# Patient Record
Sex: MALE | URBAN - METROPOLITAN AREA
[De-identification: names, ages, dates, MRNs, and addresses within clinical notes are randomized per-mention and may not be internally consistent; named-entity substitution may affect disease eponyms.]

---

## 2023-10-30 ENCOUNTER — CLAIMS CREATED FROM THE CLAIM WINDOW (OUTPATIENT)
Dept: URBAN - METROPOLITAN AREA MEDICAL CENTER 19 | Facility: MEDICAL CENTER | Age: 53
End: 2023-10-30
Payer: SELF-PAY

## 2023-10-30 DIAGNOSIS — D68.8 OTHER SPECIFIED COAGULATION DEFECTS: ICD-10-CM

## 2023-10-30 DIAGNOSIS — R74.01 ELEVATION OF LEVELS OF LIVER TRANSAMINASE LEVELS: ICD-10-CM

## 2023-10-30 DIAGNOSIS — K70.31 ALCOHOLIC CIRRHOSIS OF LIVER WITH ASCITES: ICD-10-CM

## 2023-10-30 DIAGNOSIS — D69.6 THROMBOCYTOPENIA: ICD-10-CM

## 2023-10-30 DIAGNOSIS — D64.89 ANEMIA DUE TO OTHER CAUSE, NOT CLASSIFIED: ICD-10-CM

## 2023-10-30 DIAGNOSIS — N17.8 OTHER ACUTE KIDNEY FAILURE: ICD-10-CM

## 2023-10-30 PROCEDURE — 99222 1ST HOSP IP/OBS MODERATE 55: CPT | Performed by: INTERNAL MEDICINE

## 2023-10-30 PROCEDURE — 99254 IP/OBS CNSLTJ NEW/EST MOD 60: CPT | Performed by: INTERNAL MEDICINE

## 2023-10-31 ENCOUNTER — CLAIMS CREATED FROM THE CLAIM WINDOW (OUTPATIENT)
Dept: URBAN - METROPOLITAN AREA MEDICAL CENTER 19 | Facility: MEDICAL CENTER | Age: 53
End: 2023-10-31
Payer: SELF-PAY

## 2023-10-31 DIAGNOSIS — K76.6 PORTAL HYPERTENSION: ICD-10-CM

## 2023-10-31 DIAGNOSIS — K31.89 OTHER DISEASES OF STOMACH AND DUODENUM: ICD-10-CM

## 2023-10-31 DIAGNOSIS — K74.60 CIRRHOSIS OF LIVER WITHOUT ASCITES, UNSPECIFIED HEPATIC CIRRHOSIS TYPE: ICD-10-CM

## 2023-10-31 DIAGNOSIS — I85.10 SECONDARY ESOPHAGEAL VARICES WITHOUT BLEEDING: ICD-10-CM

## 2023-10-31 PROCEDURE — 43235 EGD DIAGNOSTIC BRUSH WASH: CPT | Performed by: INTERNAL MEDICINE

## 2023-11-01 ENCOUNTER — TELEPHONE ENCOUNTER (OUTPATIENT)
Dept: URBAN - METROPOLITAN AREA CLINIC 113 | Facility: CLINIC | Age: 53
End: 2023-11-01

## 2023-11-01 ENCOUNTER — CLAIMS CREATED FROM THE CLAIM WINDOW (OUTPATIENT)
Dept: URBAN - METROPOLITAN AREA MEDICAL CENTER 19 | Facility: MEDICAL CENTER | Age: 53
End: 2023-11-01
Payer: SELF-PAY

## 2023-11-01 ENCOUNTER — LAB OUTSIDE AN ENCOUNTER (OUTPATIENT)
Dept: URBAN - METROPOLITAN AREA CLINIC 113 | Facility: CLINIC | Age: 53
End: 2023-11-01

## 2023-11-01 DIAGNOSIS — D64.89 ANEMIA DUE TO OTHER CAUSE: ICD-10-CM

## 2023-11-01 DIAGNOSIS — R79.89 ABNORMAL BILIRUBIN TEST: ICD-10-CM

## 2023-11-01 DIAGNOSIS — K70.31 ALCOHOLIC CIRRHOSIS OF LIVER WITH ASCITES: ICD-10-CM

## 2023-11-01 DIAGNOSIS — D69.59 OTHER SECONDARY THROMBOCYTOPENIA: ICD-10-CM

## 2023-11-01 DIAGNOSIS — I85.10 ESOPH VARICE OTHER DIS: ICD-10-CM

## 2023-11-01 PROCEDURE — 99232 SBSQ HOSP IP/OBS MODERATE 35: CPT | Performed by: INTERNAL MEDICINE

## 2023-12-13 ENCOUNTER — CLAIMS CREATED FROM THE CLAIM WINDOW (OUTPATIENT)
Dept: URBAN - METROPOLITAN AREA MEDICAL CENTER 19 | Facility: MEDICAL CENTER | Age: 53
End: 2023-12-13
Payer: SELF-PAY

## 2023-12-13 DIAGNOSIS — K76.7 HEPATORENAL SYNDROME: ICD-10-CM

## 2023-12-13 DIAGNOSIS — K70.31 ALCOHOLIC CIRRHOSIS OF LIVER WITH ASCITES: ICD-10-CM

## 2023-12-13 DIAGNOSIS — K76.82 HEPATIC ENCEPHALOPATHY: ICD-10-CM

## 2023-12-13 PROCEDURE — 99223 1ST HOSP IP/OBS HIGH 75: CPT | Performed by: STUDENT IN AN ORGANIZED HEALTH CARE EDUCATION/TRAINING PROGRAM

## 2023-12-13 PROCEDURE — 99233 SBSQ HOSP IP/OBS HIGH 50: CPT | Performed by: STUDENT IN AN ORGANIZED HEALTH CARE EDUCATION/TRAINING PROGRAM

## 2023-12-14 ENCOUNTER — CLAIMS CREATED FROM THE CLAIM WINDOW (OUTPATIENT)
Dept: URBAN - METROPOLITAN AREA MEDICAL CENTER 19 | Facility: MEDICAL CENTER | Age: 53
End: 2023-12-14
Payer: SELF-PAY

## 2023-12-14 DIAGNOSIS — K70.31 ALCOHOLIC CIRRHOSIS OF LIVER WITH ASCITES: ICD-10-CM

## 2023-12-14 DIAGNOSIS — K76.7 HEPATORENAL SYNDROME: ICD-10-CM

## 2023-12-14 DIAGNOSIS — K76.82 HEPATIC ENCEPHALOPATHY: ICD-10-CM

## 2023-12-14 PROCEDURE — 99233 SBSQ HOSP IP/OBS HIGH 50: CPT | Performed by: STUDENT IN AN ORGANIZED HEALTH CARE EDUCATION/TRAINING PROGRAM

## 2024-01-25 ENCOUNTER — LAB OUTSIDE AN ENCOUNTER (OUTPATIENT)
Dept: URBAN - METROPOLITAN AREA CLINIC 113 | Facility: CLINIC | Age: 54
End: 2024-01-25

## 2024-01-25 ENCOUNTER — OFFICE VISIT (OUTPATIENT)
Dept: URBAN - METROPOLITAN AREA CLINIC 113 | Facility: CLINIC | Age: 54
End: 2024-01-25
Payer: SELF-PAY

## 2024-01-25 VITALS
WEIGHT: 128 LBS | BODY MASS INDEX: 20.57 KG/M2 | TEMPERATURE: 97.3 F | HEIGHT: 66 IN | RESPIRATION RATE: 18 BRPM | SYSTOLIC BLOOD PRESSURE: 117 MMHG | DIASTOLIC BLOOD PRESSURE: 68 MMHG | HEART RATE: 71 BPM

## 2024-01-25 DIAGNOSIS — D50.0 ANEMIA: ICD-10-CM

## 2024-01-25 DIAGNOSIS — K70.31 ALCOHOLIC CIRRHOSIS OF LIVER WITH ASCITES: ICD-10-CM

## 2024-01-25 DIAGNOSIS — I85.00 ESOPHAGEAL VARICES: ICD-10-CM

## 2024-01-25 DIAGNOSIS — K76.82 HEPATIC ENCEPHALOPATHY: ICD-10-CM

## 2024-01-25 PROCEDURE — 99214 OFFICE O/P EST MOD 30 MIN: CPT | Performed by: INTERNAL MEDICINE

## 2024-01-25 RX ORDER — FUROSEMIDE 20 MG/1
1 TABLET TABLET ORAL ONCE A DAY
OUTPATIENT

## 2024-01-25 RX ORDER — SPIRONOLACTONE 50 MG/1
1 TABLET TABLET, FILM COATED ORAL ONCE A DAY
Status: ACTIVE | COMMUNITY

## 2024-01-25 RX ORDER — RIFAXIMIN 550 MG/1
1 TABLET TABLET ORAL TWICE A DAY
Status: ACTIVE | COMMUNITY

## 2024-01-25 RX ORDER — PROPRANOLOL HYDROCHLORIDE 10 MG/1
1 TABLET TABLET ORAL ONCE A DAY
OUTPATIENT

## 2024-01-25 RX ORDER — FUROSEMIDE 20 MG/1
1 TABLET TABLET ORAL ONCE A DAY
Status: ACTIVE | COMMUNITY

## 2024-01-25 RX ORDER — PROPRANOLOL HYDROCHLORIDE 10 MG/1
1 TABLET TABLET ORAL ONCE A DAY
Status: ACTIVE | COMMUNITY

## 2024-01-25 RX ORDER — PANTOPRAZOLE SODIUM 40 MG/1
1 TABLET TABLET, DELAYED RELEASE ORAL ONCE A DAY
Qty: 90 TABLET | Refills: 3 | OUTPATIENT
Start: 2024-01-25

## 2024-01-25 RX ORDER — RIFAXIMIN 550 MG/1
1 TABLET TABLET ORAL TWICE A DAY
OUTPATIENT

## 2024-01-25 RX ORDER — SPIRONOLACTONE 50 MG/1
1 TABLET TABLET, FILM COATED ORAL ONCE A DAY
OUTPATIENT

## 2024-01-25 RX ORDER — DICYCLOMINE HYDROCHLORIDE 20 MG/1
1 TABLET TABLET ORAL THREE TIMES A DAY
Status: ACTIVE | COMMUNITY

## 2024-01-25 NOTE — HPI-TODAY'S VISIT:
53-year-old male with a history of alcohol related cirrhosis presenting for follow-up after hospitalization. He was initially seen in hospital consultation on 10/30/2023 for evaluation of decompensated cirrhosis secondary to alcohol abuse with evidence for ascites.  His diuretics were held due to acute kidney injury.  Therapeutic paracentesis was planned.  He was encouraged continued alcohol avoidance.  Labs at that time demonstrated anemia with hemoglobin of 9.5.  He underwent an EGD on 10/30/2023 by Dr. Adams revealing grade 2 esophageal varices from 35 to 40 cm and mild portal hypertensive gastropathy, EGD otherwise normal to D3.  He was started on propranolol for variceal hemorrhage prophylaxis.  Outpatient follow-up for screening colonoscopy was recommended. He was readmitted in December secondary to altered mental status related to hepatic encephalopathy and abdominal pain after undergoing large-volume paracentesis outpatient, at which time he did not receive albumin.  Labs were consistent with acute kidney injury and concern for hepatorenal syndrome.  He was started on lactulose and rifaximin, as well as IV albumin.  He was ultimately discharged on low-dose Lasix 20 mg and spironolactone 50 mg once kidney function improved.  He was to maintain lactulose and rifaximin for management of hepatic encephalopathy. Fortunately, he has done well following hospital discharge.  His volume status is fairly stable on low-dose furosemide and spironolactone.  He has not experienced further episodes of confusion.  He is compliant with Xifaxan and is taking lactulose 3 times daily and having 2-3 nonbloody stools per day.  He has some abdominal swelling but has not required paracentesis since mid December.  He denies abdominal pain, nausea or vomiting.  His appetite is stable.  He has continued to avoid alcohol, with last drink in late September 2023. His only complaint is of intermittent reflux symptoms, which were well managed with pantoprazole while inpatient. Ultrasound-guided paracentesis 12/15/2023:7.8 L dark serosanguineous fluid removed. Labs 12/14/2023:H/H 7.1/20.6, MCV 99, , WBC 9.7.  AST 49, ALT 20, ALP 24.71, T. bili 8.4, albumin 3.8, total protein 6.8, CMP otherwise unremarkable aside from BUN/creat 52/2.57.  PT/INR 28.5/2.5. Abdominal x-ray 12/12/2023 showed no acute findings. Abdominal ultrasound 12/9/2023:Cirrhotic hepatic morphology, ascites. Ultrasound-guided paracentesis 12/9/2023:7.5 L removed, 12/5/2023: 9.6 L removed. Labs 10/30/2023:Negative hepatitis A antibody IgM, hepatitis B surface antigen, hepatitis B core antibody IgM, hepatitis C antibody.  Negative AMA, ASMA.  Elevated serum immunoglobulins with serum IgA 1224, IgG 2396, IgM 717.  Negative anti-LK M1.

## 2024-01-26 LAB
A/G RATIO: 0.6
ABSOLUTE BASOPHILS: 99
ABSOLUTE EOSINOPHILS: 348
ABSOLUTE LYMPHOCYTES: 1257
ABSOLUTE MONOCYTES: 973
ABSOLUTE NEUTROPHILS: 4423
AFP, SERUM, TUMOR MARKER: 4.3
ALBUMIN: 2.8
ALKALINE PHOSPHATASE: 64
ALT (SGPT): 25
AST (SGOT): 51
BASOPHILS: 1.4
BILIRUBIN, TOTAL: 6
BUN/CREATININE RATIO: 9
BUN: 16
CALCIUM: 8.4
CARBON DIOXIDE, TOTAL: 19
CHLORIDE: 101
CREATININE: 1.88
EGFR: 42
EOSINOPHILS: 4.9
FERRITIN, SERUM: 677
FOLATE (FOLIC ACID), SERUM: 5.6
GLOBULIN, TOTAL: 4.5
GLUCOSE: 158
HEMATOCRIT: 21
HEMOGLOBIN: 7.6
INR: 1.7
IRON BIND.CAP.(TIBC): 131
IRON SATURATION: 92
IRON: 121
LYMPHOCYTES: 17.7
MCH: 33
MCHC: 36.2
MCV: 91.3
MONOCYTES: 13.7
MPV: 11.7
NEUTROPHILS: 62.3
PLATELET COUNT: 162
POTASSIUM: 3.5
PROTEIN, TOTAL: 7.3
PT: 17.8
RDW: 14.4
RED BLOOD CELL COUNT: 2.3
SODIUM: 134
VITAMIN B12: 1692
WHITE BLOOD CELL COUNT: 7.1

## 2024-02-21 ENCOUNTER — COLON (OUTPATIENT)
Dept: URBAN - METROPOLITAN AREA MEDICAL CENTER 19 | Facility: MEDICAL CENTER | Age: 54
End: 2024-02-21

## 2024-02-21 RX ORDER — SPIRONOLACTONE 50 MG/1
1 TABLET TABLET, FILM COATED ORAL ONCE A DAY
Status: ACTIVE | COMMUNITY

## 2024-02-21 RX ORDER — FUROSEMIDE 20 MG/1
1 TABLET TABLET ORAL ONCE A DAY
Status: ACTIVE | COMMUNITY

## 2024-02-21 RX ORDER — PROPRANOLOL HYDROCHLORIDE 10 MG/1
1 TABLET TABLET ORAL ONCE A DAY
Status: ACTIVE | COMMUNITY

## 2024-02-21 RX ORDER — DICYCLOMINE HYDROCHLORIDE 20 MG/1
1 TABLET TABLET ORAL THREE TIMES A DAY
Status: ACTIVE | COMMUNITY

## 2024-02-21 RX ORDER — PANTOPRAZOLE SODIUM 40 MG/1
1 TABLET TABLET, DELAYED RELEASE ORAL ONCE A DAY
Qty: 90 TABLET | Refills: 3 | Status: ACTIVE | COMMUNITY
Start: 2024-01-25

## 2024-02-21 RX ORDER — RIFAXIMIN 550 MG/1
1 TABLET TABLET ORAL TWICE A DAY
Status: ACTIVE | COMMUNITY

## 2024-03-27 ENCOUNTER — COLON (OUTPATIENT)
Dept: URBAN - METROPOLITAN AREA MEDICAL CENTER 19 | Facility: MEDICAL CENTER | Age: 54
End: 2024-03-27

## 2024-03-27 RX ORDER — FUROSEMIDE 20 MG/1
1 TABLET TABLET ORAL ONCE A DAY
Status: ACTIVE | COMMUNITY

## 2024-03-27 RX ORDER — SPIRONOLACTONE 50 MG/1
1 TABLET TABLET, FILM COATED ORAL ONCE A DAY
Status: ACTIVE | COMMUNITY

## 2024-03-27 RX ORDER — PANTOPRAZOLE SODIUM 40 MG/1
1 TABLET TABLET, DELAYED RELEASE ORAL ONCE A DAY
Qty: 90 TABLET | Refills: 3 | Status: ACTIVE | COMMUNITY
Start: 2024-01-25

## 2024-03-27 RX ORDER — PROPRANOLOL HYDROCHLORIDE 10 MG/1
1 TABLET TABLET ORAL ONCE A DAY
Status: ACTIVE | COMMUNITY

## 2024-03-27 RX ORDER — DICYCLOMINE HYDROCHLORIDE 20 MG/1
1 TABLET TABLET ORAL THREE TIMES A DAY
Status: ACTIVE | COMMUNITY

## 2024-03-27 RX ORDER — RIFAXIMIN 550 MG/1
1 TABLET TABLET ORAL TWICE A DAY
Status: ACTIVE | COMMUNITY

## 2024-04-11 ENCOUNTER — OV EP (OUTPATIENT)
Dept: URBAN - METROPOLITAN AREA CLINIC 113 | Facility: CLINIC | Age: 54
End: 2024-04-11

## 2024-04-11 RX ORDER — DICYCLOMINE HYDROCHLORIDE 20 MG/1
1 TABLET TABLET ORAL THREE TIMES A DAY
Status: ACTIVE | COMMUNITY

## 2024-04-11 RX ORDER — FUROSEMIDE 20 MG/1
1 TABLET TABLET ORAL ONCE A DAY
Status: ACTIVE | COMMUNITY

## 2024-04-11 RX ORDER — RIFAXIMIN 550 MG/1
1 TABLET TABLET ORAL TWICE A DAY
Status: ACTIVE | COMMUNITY

## 2024-04-11 RX ORDER — PANTOPRAZOLE SODIUM 40 MG/1
1 TABLET TABLET, DELAYED RELEASE ORAL ONCE A DAY
Qty: 90 TABLET | Refills: 3 | Status: ACTIVE | COMMUNITY
Start: 2024-01-25

## 2024-04-11 RX ORDER — SPIRONOLACTONE 50 MG/1
1 TABLET TABLET, FILM COATED ORAL ONCE A DAY
Status: ACTIVE | COMMUNITY

## 2024-04-11 RX ORDER — PROPRANOLOL HYDROCHLORIDE 10 MG/1
1 TABLET TABLET ORAL ONCE A DAY
Status: ACTIVE | COMMUNITY

## 2024-05-03 ENCOUNTER — LAB OUTSIDE AN ENCOUNTER (OUTPATIENT)
Dept: URBAN - METROPOLITAN AREA CLINIC 113 | Facility: CLINIC | Age: 54
End: 2024-05-03

## 2024-05-03 ENCOUNTER — DASHBOARD ENCOUNTERS (OUTPATIENT)
Age: 54
End: 2024-05-03

## 2024-05-03 ENCOUNTER — OFFICE VISIT (OUTPATIENT)
Dept: URBAN - METROPOLITAN AREA CLINIC 113 | Facility: CLINIC | Age: 54
End: 2024-05-03
Payer: SELF-PAY

## 2024-05-03 VITALS
DIASTOLIC BLOOD PRESSURE: 60 MMHG | BODY MASS INDEX: 20.34 KG/M2 | RESPIRATION RATE: 18 BRPM | WEIGHT: 126.6 LBS | TEMPERATURE: 97.7 F | HEART RATE: 73 BPM | SYSTOLIC BLOOD PRESSURE: 103 MMHG | HEIGHT: 66 IN

## 2024-05-03 DIAGNOSIS — I85.00 ESOPHAGEAL VARICES: ICD-10-CM

## 2024-05-03 DIAGNOSIS — M79.604 RIGHT LEG PAIN: ICD-10-CM

## 2024-05-03 DIAGNOSIS — K70.31 ALCOHOLIC CIRRHOSIS OF LIVER WITH ASCITES: ICD-10-CM

## 2024-05-03 DIAGNOSIS — K76.82 HEPATIC ENCEPHALOPATHY: ICD-10-CM

## 2024-05-03 PROCEDURE — 99214 OFFICE O/P EST MOD 30 MIN: CPT | Performed by: NURSE PRACTITIONER

## 2024-05-03 RX ORDER — TIZANIDINE 2 MG/1
1 TABLET AT BEDTIME AS NEEDED TABLET ORAL ONCE A DAY
Status: ACTIVE | COMMUNITY

## 2024-05-03 RX ORDER — PROPRANOLOL HYDROCHLORIDE 10 MG/1
1 TABLET TABLET ORAL ONCE A DAY
OUTPATIENT

## 2024-05-03 RX ORDER — SPIRONOLACTONE 50 MG/1
1 TABLET TABLET, FILM COATED ORAL ONCE A DAY
Status: ACTIVE | COMMUNITY

## 2024-05-03 RX ORDER — FUROSEMIDE 20 MG/1
1 TABLET TABLET ORAL ONCE A DAY
OUTPATIENT

## 2024-05-03 RX ORDER — PANTOPRAZOLE SODIUM 40 MG/1
1 TABLET TABLET, DELAYED RELEASE ORAL ONCE A DAY
OUTPATIENT
Start: 2024-01-25

## 2024-05-03 RX ORDER — FUROSEMIDE 20 MG/1
1 TABLET TABLET ORAL ONCE A DAY
Status: ACTIVE | COMMUNITY

## 2024-05-03 RX ORDER — SPIRONOLACTONE 50 MG/1
1 TABLET TABLET, FILM COATED ORAL ONCE A DAY
OUTPATIENT

## 2024-05-03 RX ORDER — PROPRANOLOL HYDROCHLORIDE 10 MG/1
1 TABLET TABLET ORAL ONCE A DAY
Status: ACTIVE | COMMUNITY

## 2024-05-03 RX ORDER — PANTOPRAZOLE SODIUM 40 MG/1
1 TABLET TABLET, DELAYED RELEASE ORAL ONCE A DAY
Qty: 90 TABLET | Refills: 3 | Status: ACTIVE | COMMUNITY
Start: 2024-01-25

## 2024-05-03 RX ORDER — RIFAXIMIN 550 MG/1
1 TABLET TABLET ORAL TWICE A DAY
OUTPATIENT

## 2024-05-03 RX ORDER — DICYCLOMINE HYDROCHLORIDE 20 MG/1
1 TABLET TABLET ORAL THREE TIMES A DAY
Status: ACTIVE | COMMUNITY

## 2024-05-03 RX ORDER — RIFAXIMIN 550 MG/1
1 TABLET TABLET ORAL TWICE A DAY
Status: ACTIVE | COMMUNITY

## 2024-05-08 ENCOUNTER — TELEPHONE ENCOUNTER (OUTPATIENT)
Dept: URBAN - METROPOLITAN AREA CLINIC 113 | Facility: CLINIC | Age: 54
End: 2024-05-08

## 2024-06-14 ENCOUNTER — OFFICE VISIT (OUTPATIENT)
Dept: URBAN - METROPOLITAN AREA CLINIC 113 | Facility: CLINIC | Age: 54
End: 2024-06-14

## 2024-06-14 RX ORDER — DICYCLOMINE HYDROCHLORIDE 20 MG/1
1 TABLET TABLET ORAL THREE TIMES A DAY
Status: ACTIVE | COMMUNITY

## 2024-06-14 RX ORDER — TIZANIDINE 2 MG/1
1 TABLET AT BEDTIME AS NEEDED TABLET ORAL ONCE A DAY
Status: ACTIVE | COMMUNITY

## 2024-06-14 RX ORDER — PANTOPRAZOLE SODIUM 40 MG/1
1 TABLET TABLET, DELAYED RELEASE ORAL ONCE A DAY
Status: ACTIVE | COMMUNITY
Start: 2024-01-25

## 2024-06-14 RX ORDER — SPIRONOLACTONE 50 MG/1
1 TABLET TABLET, FILM COATED ORAL ONCE A DAY
Status: ACTIVE | COMMUNITY

## 2024-06-14 RX ORDER — FUROSEMIDE 20 MG/1
1 TABLET TABLET ORAL ONCE A DAY
Status: ACTIVE | COMMUNITY

## 2024-06-14 RX ORDER — RIFAXIMIN 550 MG/1
1 TABLET TABLET ORAL TWICE A DAY
Status: ACTIVE | COMMUNITY

## 2024-06-14 RX ORDER — PROPRANOLOL HYDROCHLORIDE 10 MG/1
1 TABLET TABLET ORAL ONCE A DAY
Status: ACTIVE | COMMUNITY

## 2024-06-14 NOTE — HPI-TODAY'S VISIT:
53-year-old male with a history of alcohol related cirrhosis presenting for follow-up. He has decompensated alcohol-related cirrhosis complicated by portal hypertension with ascites and esophageal varices, thrombocytopenia, and hepatic encephalopathy.   He has had multiple abdominal paracenteses ordered by his PCP.  He states he has received IV albumin with the last 2 draws.  US guided paracentesis- 4/10/2024:10.2 L of fluid removed. 3/18/2024:10.1 L of fluid removed. 2/5/2024:8.55 L of straw-colored fluid removed.  Colonoscopy 3/27/2024:Moderate ascending colon edema due to portal hypertensive colopathy, moderate external and internal hemorrhoids, normal TI.  Labs 1/25/2024:H/H10.6/21, MCV 91.3, , WBC 7.1.  AST 51, ALT 25, ALP 64, T. bili 6.0, albumin 2.8, total protein 7.3, CMP otherwise unremarkable aside from glucose 158, BUN/creat 16/1.88.  PT/INR 17.8/1.7.  Ferritin 677.  aFP 4.3.  Folate 5.6.  Vitamin B12 1692.  Iron 121, TIBC 131, iron sat 92.  December 2023: admitted secondary to altered mental status related to hepatic encephalopathy and abdominal pain after undergoing large-volume paracentesis outpatient, at which time he did not receive albumin. Labs were consistent with acute kidney injury and concern for hepatorenal syndrome. He was started on lactulose and rifaximin, as well as IV albumin. He was ultimately discharged on low-dose Lasix 20 mg and spironolactone 50 mg once kidney function improved. He was to maintain lactulose and rifaximin for management of hepatic encephalopathy.  Ultrasound-guided paracentesis 12/15/2023:7.8 L dark serosanguineous fluid removed.  Labs 12/14/2023:H/H 7.1/20.6, MCV 99, , WBC 9.7. AST 49, ALT 20, ALP 24.71, T. bili 8.4, albumin 3.8, total protein 6.8, CMP otherwise unremarkable aside from BUN/creat 52/2.57. PT/INR 28.5/2.5.  Abdominal x-ray 12/12/2023 showed no acute findings.  Abdominal ultrasound 12/9/2023:Cirrhotic hepatic morphology, ascites.  Ultrasound-guided paracentesis 12/9/2023:7.5 L removed, 12/5/2023: 9.6 L removed.  Labs 10/30/2023:Negative hepatitis A antibody IgM, hepatitis B surface antigen, hepatitis B core antibody IgM, hepatitis C antibody. Negative AMA, ASMA. Elevated serum immunoglobulins with serum IgA 1224, IgG 2396, IgM 717. Negative anti-LK M1.  EGD 10/30/2023 by Dr. Adams revealing grade 2 esophageal varices from 35 to 40 cm and mild portal hypertensive gastropathy, EGD otherwise normal to D3. He was started on propranolol for variceal hemorrhage prophylaxis.   His last alcohol was in September, 2023.  He requests referral for liver transplant, but unfortunately still does not have medical coverage. Labs.  June 11, 2024.  Hemoglobin 9.6.  MCV 96.  Platelet count 128,000.  Albumin 3.3.  Total bilirubin 6.7.  ALT 19.  INR 2.4.  Ammonia 62.  Creatinine 1.67. Paracentesis, June 8, 2024.  Ascitic fluid albumin less than 1.0.  Total protein and ascitic fluid less than 2.0. Admitted June 2024.  Diagnosed with spontaneous bacterial peritonitis. CT CAT scan.  Abdomen pelvis without contrast.  June 2024.  Cirrhotic liver.  Ascites noted.  Fluid-filled stomach. Labs.  May 2024.  Creatinine 1.9.  AST 43, ALT 36.  Total bilirubin 4.8.  Alpha-fetoprotein 3.4.  Hemoglobin 7.8.  Platelet count 190,000.  INR 1.7. Lower extremity ultrasound.  No DVT detected.

## 2024-07-11 ENCOUNTER — TELEPHONE ENCOUNTER (OUTPATIENT)
Dept: URBAN - METROPOLITAN AREA CLINIC 113 | Facility: CLINIC | Age: 54
End: 2024-07-11

## 2024-07-11 ENCOUNTER — CLAIMS CREATED FROM THE CLAIM WINDOW (OUTPATIENT)
Dept: URBAN - METROPOLITAN AREA MEDICAL CENTER 19 | Facility: MEDICAL CENTER | Age: 54
End: 2024-07-11
Payer: SELF-PAY

## 2024-07-11 DIAGNOSIS — K76.82 ACUTE HEPATIC ENCEPHALOPATHY: ICD-10-CM

## 2024-07-11 DIAGNOSIS — K70.31 ALCOHOLIC CIRRHOSIS OF LIVER WITH ASCITES: ICD-10-CM

## 2024-07-11 DIAGNOSIS — Z87.19 PERSONAL HISTORY OF OTHER DISEASES OF THE DIGESTIVE SYSTEM: ICD-10-CM

## 2024-07-11 DIAGNOSIS — K56.7 ILEUS: ICD-10-CM

## 2024-07-11 PROCEDURE — 99254 IP/OBS CNSLTJ NEW/EST MOD 60: CPT | Performed by: INTERNAL MEDICINE

## 2024-07-12 ENCOUNTER — CLAIMS CREATED FROM THE CLAIM WINDOW (OUTPATIENT)
Dept: URBAN - METROPOLITAN AREA MEDICAL CENTER 19 | Facility: MEDICAL CENTER | Age: 54
End: 2024-07-12
Payer: SELF-PAY

## 2024-07-12 DIAGNOSIS — D64.89 ANEMIA DUE TO OTHER CAUSE, NOT CLASSIFIED: ICD-10-CM

## 2024-07-12 DIAGNOSIS — K76.82 HEPATIC ENCEPHALOPATHY: ICD-10-CM

## 2024-07-12 DIAGNOSIS — K70.30 ALC (ALCOHOLIC LIVER CIRRHOSIS): ICD-10-CM

## 2024-07-12 PROCEDURE — 99232 SBSQ HOSP IP/OBS MODERATE 35: CPT | Performed by: INTERNAL MEDICINE

## 2024-07-14 ENCOUNTER — CLAIMS CREATED FROM THE CLAIM WINDOW (OUTPATIENT)
Dept: URBAN - METROPOLITAN AREA MEDICAL CENTER 19 | Facility: MEDICAL CENTER | Age: 54
End: 2024-07-14
Payer: SELF-PAY

## 2024-07-14 DIAGNOSIS — K76.82 HEPATIC ENCEPHALOPATHY: ICD-10-CM

## 2024-07-14 DIAGNOSIS — Z87.19 PERSONAL HISTORY OF OTHER DISEASES OF THE DIGESTIVE SYSTEM: ICD-10-CM

## 2024-07-14 DIAGNOSIS — K70.30 ALC (ALCOHOLIC LIVER CIRRHOSIS): ICD-10-CM

## 2024-07-14 PROCEDURE — 99233 SBSQ HOSP IP/OBS HIGH 50: CPT | Performed by: INTERNAL MEDICINE

## 2024-07-18 ENCOUNTER — OFFICE VISIT (OUTPATIENT)
Dept: URBAN - METROPOLITAN AREA CLINIC 113 | Facility: CLINIC | Age: 54
End: 2024-07-18

## 2024-08-26 ENCOUNTER — LAB OUTSIDE AN ENCOUNTER (OUTPATIENT)
Dept: URBAN - METROPOLITAN AREA CLINIC 113 | Facility: CLINIC | Age: 54
End: 2024-08-26

## 2024-08-26 ENCOUNTER — OFFICE VISIT (OUTPATIENT)
Dept: URBAN - METROPOLITAN AREA CLINIC 113 | Facility: CLINIC | Age: 54
End: 2024-08-26
Payer: SELF-PAY

## 2024-08-26 VITALS
DIASTOLIC BLOOD PRESSURE: 80 MMHG | BODY MASS INDEX: 19.77 KG/M2 | WEIGHT: 123 LBS | HEIGHT: 66 IN | TEMPERATURE: 97.9 F | RESPIRATION RATE: 18 BRPM | HEART RATE: 81 BPM | SYSTOLIC BLOOD PRESSURE: 123 MMHG

## 2024-08-26 DIAGNOSIS — K70.31 ALCOHOLIC CIRRHOSIS OF LIVER WITH ASCITES: ICD-10-CM

## 2024-08-26 DIAGNOSIS — K74.69 CIRRHOSIS, CRYPTOGENIC: ICD-10-CM

## 2024-08-26 DIAGNOSIS — I85.00 ESOPHAGEAL VARICES: ICD-10-CM

## 2024-08-26 PROCEDURE — 99214 OFFICE O/P EST MOD 30 MIN: CPT | Performed by: INTERNAL MEDICINE

## 2024-08-26 RX ORDER — PANTOPRAZOLE SODIUM 40 MG/1
1 TABLET TABLET, DELAYED RELEASE ORAL ONCE A DAY
Status: ACTIVE | COMMUNITY
Start: 2024-01-25

## 2024-08-26 RX ORDER — PROPRANOLOL HYDROCHLORIDE 10 MG/1
1 TABLET TABLET ORAL ONCE A DAY
Status: ACTIVE | COMMUNITY

## 2024-08-26 RX ORDER — FUROSEMIDE 40 MG/1
1 TABLET TABLET ORAL ONCE A DAY
Status: ACTIVE | COMMUNITY

## 2024-08-26 RX ORDER — SPIRONOLACTONE 50 MG/1
1 TABLET TABLET, FILM COATED ORAL ONCE A DAY
Qty: 30 | Refills: 11 | OUTPATIENT
Start: 2024-08-26 | End: 2025-08-21

## 2024-08-26 NOTE — PHYSICAL EXAM GASTROINTESTINAL
Abdomen , soft, nontender, distended, no guarding or rigidity, no masses palpable, normal bowel sounds. Liver and Spleen, no hepatomegaly present, no hepatosplenomegaly, liver nontender, spleen not palpable (moderate ascites, no edema)

## 2024-08-26 NOTE — HPI-TODAY'S VISIT:
54-year-old male with a history of alcohol related cirrhosis presenting for follow-up. He has decompensated alcohol-related cirrhosis complicated by portal hypertension with ascites and esophageal varices, thrombocytopenia, and hepatic encephalopathy.  In July 2024, he was diagnosed with possible portal vein thrombosis. He was started on Xarelto. He had weakness. He is admitted to the hospital in August. He is found to have a hemoglobin in the 5.3 range. His Xarelto was stopped. At some point his spironolactone was discontinued as well. He has for history of ascites. He tries to follow a low-salt diet. I am concerned he might not be compliant with this aspect of care. He does take furosemide. He brings his medications in today. He also is on rifaximin which he obtains from [facility]. He does not have dysphagia heartburn or regurgitation at this time. He still wants to see the [place] liver transplant team. He does not have insurance. I am concerned this will be a major barrier for transplant  Abdominal ultrasound. Paracentesis. 5.5 L removed August 13, 2024.  Labs. August 2, 2024. Hemoglobin 5.4. Platelet count 150,000. He was transfused with 2 units of blood. Total bilirubin 5.0. AST 66, alkaline phosphatase 133. Creatinine 1.7. INR 2.3. HIV negative.  Paracentesis August 2024. Ascitic fluid white blood cell count 93. Culture was negative. Ascitic fluid albumin less than 1  Ultrasound-guided thoracentesis. For right pleural effusion. August 2024. 1900 cc removed.  Abdominal ultrasound with Doppler July 2024. Portal vein thrombosis could not be excluded. Cirrhotic liver morphology noted  CT scan abdomen pelvis without contrast. June 2024. Cirrhotic liver. Ascites noted. Fluid-filled stomach  Labs. June 11, 2024. Hemoglobin 9.6. MCV 96. Platelet count 128,000. Albumin 3.3. Total bilirubin 6.7. ALT 19. INR 2.4. Ammonia 62. Creatinine 1.67.  Paracentesis, June 8, 2024. Ascitic fluid albumin less than 1.0. Total protein and ascitic fluid less than 2.0.  Admitted June 2024. Diagnosed with spontaneous bacterial peritonitis.  CT CAT scan. Abdomen pelvis without contrast. June 2024. Cirrhotic liver. Ascites noted. Fluid-filled stomach.  Labs. May 2024. Creatinine 1.9. AST 43, ALT 36. Total bilirubin 4.8. Alpha-fetoprotein 3.4. Hemoglobin 7.8. Platelet count 190,000. INR 1.7.  Lower extremity ultrasound. No DVT detected.  US guided paracentesis- 4/10/2024:10.2 L of fluid removed. 3/18/2024:10.1 L of fluid removed. 2/5/2024:8.55 L of straw-colored fluid removed.  Colonoscopy 3/27/2024:Moderate ascending colon edema due to portal hypertensive colopathy, moderate external and internal hemorrhoids, normal TI.  Labs 1/25/2024:H/H10.6/21, MCV 91.3, , WBC 7.1.  AST 51, ALT 25, ALP 64, T. bili 6.0, albumin 2.8, total protein 7.3, CMP otherwise unremarkable aside from glucose 158, BUN/creat 16/1.88.  PT/INR 17.8/1.7.  Ferritin 677.  aFP 4.3.  Folate 5.6.  Vitamin B12 1692.  Iron 121, TIBC 131, iron sat 92.  December 2023: admitted secondary to altered mental status related to hepatic encephalopathy and abdominal pain after undergoing large-volume paracentesis outpatient, at which time he did not receive albumin. Labs were consistent with acute kidney injury and concern for hepatorenal syndrome. He was started on lactulose and rifaximin, as well as IV albumin. He was ultimately discharged on low-dose Lasix 20 mg and spironolactone 50 mg once kidney function improved. He was to maintain lactulose and rifaximin for management of hepatic encephalopathy.  Ultrasound-guided paracentesis 12/15/2023:7.8 L dark serosanguineous fluid removed.  Labs 12/14/2023:H/H 7.1/20.6, MCV 99, , WBC 9.7. AST 49, ALT 20, ALP 24.71, T. bili 8.4, albumin 3.8, total protein 6.8, CMP otherwise unremarkable aside from BUN/creat 52/2.57. PT/INR 28.5/2.5.  Abdominal x-ray 12/12/2023 showed no acute findings.  Abdominal ultrasound 12/9/2023:Cirrhotic hepatic morphology, ascites.  Ultrasound-guided paracentesis 12/9/2023:7.5 L removed, 12/5/2023: 9.6 L removed.  Labs 10/30/2023:Negative hepatitis A antibody IgM, hepatitis B surface antigen, hepatitis B core antibody IgM, hepatitis C antibody. Negative AMA, ASMA. Elevated serum immunoglobulins with serum IgA 1224, IgG 2396, IgM 717. Negative anti-LK M1.  EGD 10/30/2023 by Dr. Adams revealing grade 2 esophageal varices from 35 to 40 cm and mild portal hypertensive gastropathy, EGD otherwise normal to D3. He was started on propranolol for variceal hemorrhage prophylaxis.   His last alcohol was in September, 2023.  He requests referral for liver transplant, but unfortunately still does not have medical coverage.

## 2024-08-26 NOTE — PHYSICAL EXAM CHEST:
What Type Of Note Output Would You Prefer (Optional)?: Bullet Format
no lesions,  no deformities,  no traumatic injuries,  no significant scars are present,  chest wall non-tender,  no masses present, breathing is unlabored without accessory muscle use,normal breath sounds
Is The Patient Presenting As Previously Scheduled?: Yes
How Severe Is Your Rash?: moderate
Is This A New Presentation, Or A Follow-Up?: Rash

## 2024-08-27 LAB
A/G RATIO: 0.7
ABSOLUTE BASOPHILS: 42
ABSOLUTE EOSINOPHILS: 218
ABSOLUTE LYMPHOCYTES: 952
ABSOLUTE MONOCYTES: 905
ABSOLUTE NEUTROPHILS: 3084
AFP, SERUM, TUMOR MARKER: 2.4
ALBUMIN: 2.9
ALKALINE PHOSPHATASE: 79
ALT (SGPT): 13
AST (SGOT): 28
BASOPHILS: 0.8
BILIRUBIN, TOTAL: 6.4
BUN/CREATININE RATIO: 9
BUN: 16
CALCIUM: 8.4
CARBON DIOXIDE, TOTAL: 19
CHLORIDE: 107
CREATININE: 1.84
EGFR: 43
EOSINOPHILS: 4.2
FERRITIN, SERUM: 1230
FOLATE (FOLIC ACID), SERUM: 12.7
GLOBULIN, TOTAL: 4.1
GLUCOSE: 183
HEMATOCRIT: 18.2
HEMOGLOBIN: 6.5
INR: 1.6
IRON BIND.CAP.(TIBC): 120
IRON SATURATION: 92
IRON: 110
LYMPHOCYTES: 18.3
MCH: 30.5
MCHC: 35.7
MCV: 85.4
MONOCYTES: 17.4
MPV: 12.4
NEUTROPHILS: 59.3
PLATELET COUNT: 170
POTASSIUM: 3.3
PROTEIN, TOTAL: 7
PT: 16.6
RDW: 21
RED BLOOD CELL COUNT: 2.13
SODIUM: 136
VITAMIN B12: 1434
WHITE BLOOD CELL COUNT: 5.2

## 2024-09-04 ENCOUNTER — TELEPHONE ENCOUNTER (OUTPATIENT)
Dept: URBAN - METROPOLITAN AREA CLINIC 113 | Facility: CLINIC | Age: 54
End: 2024-09-04

## 2024-09-18 ENCOUNTER — TELEPHONE ENCOUNTER (OUTPATIENT)
Dept: URBAN - METROPOLITAN AREA SURGERY CENTER 25 | Facility: SURGERY CENTER | Age: 54
End: 2024-09-18

## 2024-10-01 ENCOUNTER — CLAIMS CREATED FROM THE CLAIM WINDOW (OUTPATIENT)
Dept: URBAN - METROPOLITAN AREA MEDICAL CENTER 19 | Facility: MEDICAL CENTER | Age: 54
End: 2024-10-01
Payer: SELF-PAY

## 2024-10-01 ENCOUNTER — OFFICE VISIT (OUTPATIENT)
Dept: URBAN - METROPOLITAN AREA MEDICAL CENTER 19 | Facility: MEDICAL CENTER | Age: 54
End: 2024-10-01

## 2024-10-01 DIAGNOSIS — R18.8 ABDOMINAL ASCITES: ICD-10-CM

## 2024-10-01 DIAGNOSIS — K74.69 CIRRHOSIS, CRYPTOGENIC: ICD-10-CM

## 2024-10-01 DIAGNOSIS — I85.10 ESOPH VARICE OTHER DIS: ICD-10-CM

## 2024-10-01 DIAGNOSIS — R10.84 ABDOMINAL CRAMPING, GENERALIZED: ICD-10-CM

## 2024-10-01 PROCEDURE — 99233 SBSQ HOSP IP/OBS HIGH 50: CPT | Performed by: INTERNAL MEDICINE

## 2024-10-01 RX ORDER — FUROSEMIDE 40 MG/1
1 TABLET TABLET ORAL ONCE A DAY
Status: ACTIVE | COMMUNITY

## 2024-10-01 RX ORDER — SPIRONOLACTONE 50 MG/1
1 TABLET TABLET, FILM COATED ORAL ONCE A DAY
Qty: 30 | Refills: 11 | Status: ACTIVE | COMMUNITY
Start: 2024-08-26 | End: 2025-08-21

## 2024-10-01 RX ORDER — PROPRANOLOL HYDROCHLORIDE 10 MG/1
1 TABLET TABLET ORAL ONCE A DAY
Status: ACTIVE | COMMUNITY

## 2024-10-01 RX ORDER — PANTOPRAZOLE SODIUM 40 MG/1
1 TABLET TABLET, DELAYED RELEASE ORAL ONCE A DAY
Status: ACTIVE | COMMUNITY
Start: 2024-01-25

## 2024-10-02 ENCOUNTER — CLAIMS CREATED FROM THE CLAIM WINDOW (OUTPATIENT)
Dept: URBAN - METROPOLITAN AREA MEDICAL CENTER 19 | Facility: MEDICAL CENTER | Age: 54
End: 2024-10-02
Payer: SELF-PAY

## 2024-10-02 DIAGNOSIS — I85.10 SECONDARY ESOPHAGEAL VARICES WITHOUT BLEEDING: ICD-10-CM

## 2024-10-02 DIAGNOSIS — K76.6 PORTAL HYPERTENSION: ICD-10-CM

## 2024-10-02 DIAGNOSIS — K31.89 OTHER DISEASES OF STOMACH AND DUODENUM: ICD-10-CM

## 2024-10-02 PROCEDURE — 43235 EGD DIAGNOSTIC BRUSH WASH: CPT | Performed by: INTERNAL MEDICINE

## 2024-10-03 ENCOUNTER — CLAIMS CREATED FROM THE CLAIM WINDOW (OUTPATIENT)
Dept: URBAN - METROPOLITAN AREA MEDICAL CENTER 19 | Facility: MEDICAL CENTER | Age: 54
End: 2024-10-03
Payer: SELF-PAY

## 2024-10-03 DIAGNOSIS — K74.69 CIRRHOSIS, CRYPTOGENIC: ICD-10-CM

## 2024-10-03 DIAGNOSIS — I85.10 ESOPH VARICE OTHER DIS: ICD-10-CM

## 2024-10-03 DIAGNOSIS — D64.89 ANEMIA DUE TO OTHER CAUSE: ICD-10-CM

## 2024-10-03 DIAGNOSIS — R18.8 ABDOMINAL ASCITES: ICD-10-CM

## 2024-10-03 PROCEDURE — 99232 SBSQ HOSP IP/OBS MODERATE 35: CPT | Performed by: INTERNAL MEDICINE

## 2024-11-26 ENCOUNTER — OFFICE VISIT (OUTPATIENT)
Dept: URBAN - METROPOLITAN AREA CLINIC 113 | Facility: CLINIC | Age: 54
End: 2024-11-26

## 2024-11-26 RX ORDER — PROPRANOLOL HYDROCHLORIDE 10 MG/1
1 TABLET TABLET ORAL ONCE A DAY
Status: ACTIVE | COMMUNITY

## 2024-11-26 RX ORDER — SPIRONOLACTONE 50 MG/1
1 TABLET TABLET, FILM COATED ORAL ONCE A DAY
Qty: 30 | Refills: 11 | Status: ACTIVE | COMMUNITY
Start: 2024-08-26 | End: 2025-08-21

## 2024-11-26 RX ORDER — FUROSEMIDE 40 MG/1
1 TABLET TABLET ORAL ONCE A DAY
Status: ACTIVE | COMMUNITY

## 2024-11-26 RX ORDER — PANTOPRAZOLE SODIUM 40 MG/1
1 TABLET TABLET, DELAYED RELEASE ORAL ONCE A DAY
Status: ACTIVE | COMMUNITY
Start: 2024-01-25

## 2024-12-10 ENCOUNTER — LAB OUTSIDE AN ENCOUNTER (OUTPATIENT)
Dept: URBAN - METROPOLITAN AREA CLINIC 113 | Facility: CLINIC | Age: 54
End: 2024-12-10

## 2024-12-10 ENCOUNTER — TELEPHONE ENCOUNTER (OUTPATIENT)
Dept: URBAN - METROPOLITAN AREA CLINIC 113 | Facility: CLINIC | Age: 54
End: 2024-12-10

## 2024-12-12 ENCOUNTER — OFFICE VISIT (OUTPATIENT)
Dept: URBAN - METROPOLITAN AREA CLINIC 113 | Facility: CLINIC | Age: 54
End: 2024-12-12
Payer: COMMERCIAL

## 2024-12-12 ENCOUNTER — TELEPHONE ENCOUNTER (OUTPATIENT)
Dept: URBAN - METROPOLITAN AREA CLINIC 113 | Facility: CLINIC | Age: 54
End: 2024-12-12

## 2024-12-12 ENCOUNTER — LAB OUTSIDE AN ENCOUNTER (OUTPATIENT)
Dept: URBAN - METROPOLITAN AREA CLINIC 113 | Facility: CLINIC | Age: 54
End: 2024-12-12

## 2024-12-12 VITALS
HEART RATE: 80 BPM | DIASTOLIC BLOOD PRESSURE: 75 MMHG | SYSTOLIC BLOOD PRESSURE: 124 MMHG | RESPIRATION RATE: 18 BRPM | TEMPERATURE: 98.2 F | WEIGHT: 120 LBS | HEIGHT: 66 IN | BODY MASS INDEX: 19.29 KG/M2

## 2024-12-12 DIAGNOSIS — I85.00 ESOPHAGEAL VARICES: ICD-10-CM

## 2024-12-12 DIAGNOSIS — K70.31 ALCOHOLIC CIRRHOSIS OF LIVER WITH ASCITES: ICD-10-CM

## 2024-12-12 DIAGNOSIS — R19.7 DIARRHEA: ICD-10-CM

## 2024-12-12 DIAGNOSIS — K74.60 CIRRHOSIS: ICD-10-CM

## 2024-12-12 DIAGNOSIS — J90 PLEURAL EFFUSION: ICD-10-CM

## 2024-12-12 DIAGNOSIS — J94.8 HYDROTHORAX: ICD-10-CM

## 2024-12-12 DIAGNOSIS — R07.9 CHEST PAIN: ICD-10-CM

## 2024-12-12 PROCEDURE — 99214 OFFICE O/P EST MOD 30 MIN: CPT | Performed by: INTERNAL MEDICINE

## 2024-12-12 RX ORDER — TIZANIDINE 2 MG/1
1 TABLET AT BEDTIME AS NEEDED TABLET ORAL ONCE A DAY
Status: ACTIVE | COMMUNITY

## 2024-12-12 RX ORDER — FUROSEMIDE 40 MG/1
1 TABLET TABLET ORAL ONCE A DAY
Status: ACTIVE | COMMUNITY

## 2024-12-12 RX ORDER — PROPRANOLOL HYDROCHLORIDE 10 MG/1
1 TABLET TABLET ORAL ONCE A DAY
Status: ACTIVE | COMMUNITY

## 2024-12-12 RX ORDER — LACTULOSE 10 G/10G
1 PACKET AS NEEDED SOLUTION ORAL ONCE A DAY
Status: ACTIVE | COMMUNITY

## 2024-12-12 RX ORDER — PANTOPRAZOLE SODIUM 40 MG/1
1 TABLET TABLET, DELAYED RELEASE ORAL ONCE A DAY
Status: ACTIVE | COMMUNITY
Start: 2024-01-25

## 2024-12-12 RX ORDER — TRAMADOL HYDROCHLORIDE 50 MG/1
1 TABLET TABLET, FILM COATED ORAL
Qty: 45 | Refills: 4 | OUTPATIENT
Start: 2024-12-12 | End: 2025-05-11

## 2024-12-12 NOTE — HPI-TODAY'S VISIT:
54-year-old male with a history of alcohol related cirrhosis presenting for follow-up. He has decompensated alcohol-related cirrhosis complicated by portal hypertension with ascites and esophageal varices, thrombocytopenia, and hepatic encephalopathy.  He still has not heard from [facility] transplant. He is having some right-sided chest pain. Intractable. He went to the ER at the [facility]!. Then he left. Then today during his evaluation the [facility] actually called his cell phone and I was able to speak to the liver transplant coordinator which was amazing. He is having diarrhea. No rectal bleeding or melena. He has ascites but no significant abdominal pain. No fever.  Labs. [facility] emergency room. December 2024. Total bilirubin 4.6, AST 55, ALT 23, alkaline phos is 117, INR 1.8  Thoracentesis. December 3, 2024. 1350 cc removed.  Labs. December 3, 2024. Hemoglobin 8.8, MCV 97. Platelet count 117K, INR 1.9.  Paracentesis. November 2024. 8.8 L removed. Albumin less than 1. Right pleural effusion.  Labs. November 27, 2024. Hemoglobin 6.5. He was admitted for transfusion and then discharge.  EGD. October 2024. Grade 2 esophageal varices from 34 to 40 cm. Mild portal hypertensive gastropathy.  August 2024:  He is admitted to the hospital in August. He is found to have a hemoglobin in the 5.3 range. His Xarelto was stopped.   July 2024: diagnosed with possible portal vein thrombosis. He was started on Xarelto.   He still wants to see the [facility] liver transplant team. He does not have insurance. I am concerned this will be a major barrier for transplant  Abdominal ultrasound. Paracentesis. 5.5 L removed August 13, 2024.  Labs. August 2, 2024. Hemoglobin 5.4. Platelet count 150,000. He was transfused with 2 units of blood. Total bilirubin 5.0. AST 66, alkaline phosphatase 133. Creatinine 1.7. INR 2.3. HIV negative.  Paracentesis August 2024. Ascitic fluid white blood cell count 93. Culture was negative. Ascitic fluid albumin < 1  Ultrasound-guided thoracentesis. For right pleural effusion. August 2024. 1900 cc removed.  Abdominal ultrasound with Doppler July 2024. Portal vein thrombosis could not be excluded. Cirrhotic liver morphology noted  CT scan abdomen pelvis without contrast. June 2024. Cirrhotic liver. Ascites noted. Fluid-filled stomach  Labs. June 11, 2024. Hemoglobin 9.6. MCV 96. Platelet count 128,000. Albumin 3.3. Total bilirubin 6.7. ALT 19. INR 2.4. Ammonia 62. Creatinine 1.67.  Paracentesis, June 8, 2024. Ascitic fluid albumin less than 1.0. Total protein and ascitic fluid less than 2.0.  Admitted June 2024. Diagnosed with spontaneous bacterial peritonitis.  CT scan. Abdomen pelvis without contrast. June 2024. Cirrhotic liver. Ascites noted. Fluid-filled stomach.  Labs. May 2024. Creatinine 1.9. AST 43, ALT 36. Total bilirubin 4.8. Alpha-fetoprotein 3.4. Hemoglobin 7.8. Platelet count 190,000. INR 1.7.  Lower extremity ultrasound. No DVT detected.  US guided paracentesis- 4/10/2024:10.2 L of fluid removed. 3/18/2024:10.1 L of fluid removed. 2/5/2024:8.55 L of straw-colored fluid removed.  Colonoscopy 3/27/2024:Moderate ascending colon edema due to portal hypertensive colopathy, moderate external and internal hemorrhoids, normal TI.  Labs 1/25/2024:H/H10.6/21, MCV 91.3, , WBC 7.1.  AST 51, ALT 25, ALP 64, T. bili 6.0, albumin 2.8, total protein 7.3, CMP otherwise unremarkable aside from glucose 158, BUN/creat 16/1.88.  PT/INR 17.8/1.7.  Ferritin 677.  aFP 4.3.  Folate 5.6.  Vitamin B12 1692.  Iron 121, TIBC 131, iron sat 92.  December 2023: admitted secondary to altered mental status related to hepatic encephalopathy and abdominal pain after undergoing large-volume paracentesis outpatient, at which time he did not receive albumin. Labs were consistent with acute kidney injury and concern for hepatorenal syndrome. He was started on lactulose and rifaximin, as well as IV albumin. He was ultimately discharged on low-dose Lasix 20 mg and spironolactone 50 mg once kidney function improved. He was to maintain lactulose and rifaximin for management of hepatic encephalopathy.  Ultrasound-guided paracentesis 12/15/2023:7.8 L dark serosanguineous fluid removed.  Labs 12/14/2023:H/H 7.1/20.6, MCV 99, , WBC 9.7. AST 49, ALT 20, ALP 24.71, T. bili 8.4, albumin 3.8, total protein 6.8, CMP otherwise unremarkable aside from BUN/creat 52/2.57. PT/INR 28.5/2.5.  Abdominal x-ray 12/12/2023 showed no acute findings.  Abdominal ultrasound 12/9/2023:Cirrhotic hepatic morphology, ascites.  Ultrasound-guided paracentesis 12/9/2023:7.5 L removed, 12/5/2023: 9.6 L removed.  Labs 10/30/2023:Negative hepatitis A antibody IgM, hepatitis B surface antigen, hepatitis B core antibody IgM, hepatitis C antibody. Negative AMA, ASMA. Elevated serum immunoglobulins with serum IgA 1224, IgG 2396, IgM 717. Negative anti-LK M1.  EGD 10/30/2023 by Dr. Adams revealing grade 2 esophageal varices from 35 to 40 cm and mild portal hypertensive gastropathy, EGD otherwise normal to D3. He was started on propranolol for variceal hemorrhage prophylaxis.   His last alcohol was in September, 2023.  He requests referral for liver transplant, but unfortunately still does not have medical coverage.

## 2024-12-17 ENCOUNTER — TELEPHONE ENCOUNTER (OUTPATIENT)
Dept: URBAN - METROPOLITAN AREA CLINIC 113 | Facility: CLINIC | Age: 54
End: 2024-12-17

## 2024-12-18 ENCOUNTER — TELEPHONE ENCOUNTER (OUTPATIENT)
Dept: URBAN - METROPOLITAN AREA CLINIC 23 | Facility: CLINIC | Age: 54
End: 2024-12-18

## 2024-12-20 LAB
CALPROTECTIN, FECAL: 14
CAMPYLOBACTER GROUP: NOT DETECTED
CLOSTRIDIUM DIFFICILE: (no result)
CRYPTOSPORIDIUM ANTIGEN,: (no result)
CULTURE: (no result)
FECAL FAT, QUALITATIVE: NORMAL
NOROVIRUS GI/GII: NOT DETECTED
OVA AND PARASITES, CONC AND PERM SMEAR: (no result)
PANCREATIC ELASTASE, FECAL: 266
ROTAVIRUS A: NOT DETECTED
SALMONELLA SPECIES: NOT DETECTED
SHIGA TOXIN 1: NOT DETECTED
SHIGA TOXIN 2: NOT DETECTED
SHIGELLA SPECIES: NOT DETECTED
VIBRIO GROUP: NOT DETECTED
YERSINIA ENTEROCOLITICA: NOT DETECTED

## 2025-03-13 ENCOUNTER — OFFICE VISIT (OUTPATIENT)
Dept: URBAN - METROPOLITAN AREA CLINIC 113 | Facility: CLINIC | Age: 55
End: 2025-03-13

## 2025-03-13 RX ORDER — FUROSEMIDE 40 MG/1
1 TABLET TABLET ORAL ONCE A DAY
Status: ACTIVE | COMMUNITY

## 2025-03-13 RX ORDER — LACTULOSE 10 G/10G
1 PACKET AS NEEDED SOLUTION ORAL ONCE A DAY
Status: ACTIVE | COMMUNITY

## 2025-03-13 RX ORDER — TRAMADOL HYDROCHLORIDE 50 MG/1
1 TABLET TABLET, FILM COATED ORAL
Qty: 45 | Refills: 4 | Status: ACTIVE | COMMUNITY
Start: 2024-12-12 | End: 2025-05-11

## 2025-03-13 RX ORDER — PROPRANOLOL HYDROCHLORIDE 10 MG/1
1 TABLET TABLET ORAL ONCE A DAY
Status: ACTIVE | COMMUNITY

## 2025-03-13 RX ORDER — TIZANIDINE 2 MG/1
1 TABLET AT BEDTIME AS NEEDED TABLET ORAL ONCE A DAY
Status: ACTIVE | COMMUNITY

## 2025-03-13 RX ORDER — PANTOPRAZOLE SODIUM 40 MG/1
1 TABLET TABLET, DELAYED RELEASE ORAL ONCE A DAY
Status: ACTIVE | COMMUNITY
Start: 2024-01-25

## 2025-03-13 NOTE — HPI-TODAY'S VISIT:
CT scan chest and abdomen.  December 2024.  Moderate to large right pleural effusion.  Complete collapse of the right lower and middle lobes.  Cirrhosis.  Large volume ascites.  Perigastric and periesophageal venous collaterals noted. Stool studies.  December 2024.  Stool culture negative, ova and parasites negative, fecal elastase 266 which is normal, calprotectin 14, Cryptosporidium negative, stool pathogen panel negative, C. difficile toxin negative, fecal fat normal.